# Patient Record
(demographics unavailable — no encounter records)

---

## 2024-11-12 NOTE — ASSESSMENT
[FreeTextEntry1] : 48-year-old male with left subclavian artery steal and multiple attacks of dizziness and ataxia  Subclavian steal:  Status post carotid subclavian bypass Bilateral carotid artery stenosis: Bilateral TCAR stents are patent Continue antiplatelet therapy aspirin 81 mg daily Continue medical management with atorvastatin and antidiabetic medication  Coronary artery disease: Status post CABG   PAD Ankle-brachial indicis makes follow-up

## 2024-11-12 NOTE — HISTORY OF PRESENT ILLNESS
[FreeTextEntry1] : 48-year-old male with coronary artery disease a status post CABG, bilateral carotid artery disease status post bilateral TCAR, left subclavian artery stenosis presented for evaluation of left subclavian steal.  He has multiple dizziness and ataxia and was found to have left proximal subclavian artery occlusion.  He also has left LIMA bypass during his coronary artery bypass grafting.  Recently he underwent left brachial approach attempt to revascularize left proximal subclavian however due to severe calcification and stenosis the lesion it could not be traversed.  He denies TIA amaurosis fugax numbness or weakness.  Complains of ataxia and dizziness usually with standing or exertional.  9/5/2024 patient is status post left carotid subclavian bypass.  Doing well.  Dizziness is somewhat decreased.    1/12/2024: Patient with left Subclavian steal status post carotid subclavian bypass.  Complains of orthostatic hypotension.  His dizziness is improved since the surgery however he still has orthostatic hypotension and dizziness associated with it.  He also is here after he saw his podiatrist.  He does not have any gangrene or wounds however complains of bilateral lower leg neuropathy.  Denies claudication.  Denies rest pain.

## 2024-11-12 NOTE — REVIEW OF SYSTEMS
[As Noted in HPI] : as noted in HPI [Confused] : no confusion [Convulsions] : no convulsions [Dizziness] : dizziness [Fainting] : no fainting [Limb Weakness] : no limb weakness [Difficulty Walking] : difficulty walking [Negative] : Heme/Lymph

## 2024-11-12 NOTE — PHYSICAL EXAM
[Ankle Swelling (On Exam)] : not present [Varicose Veins Of Lower Extremities] : not present [] : not present [Abdomen Tenderness] : ~T ~M No abdominal tenderness [de-identified] : Alert and oriented no acute distress [de-identified] : Head and neck within normal limit [de-identified] : TCAR incision completely healed [de-identified] : Clear to auscultation bilaterally [de-identified] : Regular rate and rhythm [FreeTextEntry1] : Palpable right radial pulse.  Palpable right brachial pulse.  Palpable bilateral femoral pulses.

## 2024-12-19 NOTE — PHYSICAL EXAM
[No Acute Distress] : no acute distress [Normal Oropharynx] : normal oropharynx [Normal Appearance] : normal appearance [No Neck Mass] : no neck mass [Normal Rate/Rhythm] : normal rate/rhythm [No Resp Distress] : no resp distress [Clear to Auscultation Bilaterally] : clear to auscultation bilaterally [No Abnormalities] : no abnormalities [Benign] : benign [Normal Gait] : normal gait [No Clubbing] : no clubbing [No Cyanosis] : no cyanosis [No Edema] : no edema [FROM] : FROM [Normal Color/ Pigmentation] : normal color/ pigmentation [No Focal Deficits] : no focal deficits [Oriented x3] : oriented x3 [Normal Affect] : normal affect

## 2024-12-19 NOTE — ASSESSMENT
[FreeTextEntry1] :  48y/o M with PMHX of CABG, orthostatic hypotension, DM, triple bypass and subclavian bypass, who presents to establish pulmonary care with a c/o sob.  Reports episodes of light headedness when standing, leading to sob. Denies cough, fever, with no known history of childhood asthma, eczema, frequent URI, history of PNA, or intubation.   -Ventilation/Perfusion imaging ordered -PFT ordered -Flu vaccination pending with sister's pharmacy  attending: agree with above extensive cardiovascular hx orthostatic hypotension. sob associated with those episodes nonsmoker and no pulm hx post cabg elevated left hemidiaphragm and fluoro with likely paresis though sx not c/w this. may have also recovered PFT. repeat diaphragmatic study

## 2024-12-19 NOTE — REVIEW OF SYSTEMS
[Dyspnea] : dyspnea [SOB on Exertion] : sob on exertion [Diabetes] : diabetes [Negative] : Psychiatric [Thyroid Problem] : no thyroid problem [Obesity] : no obesity

## 2024-12-19 NOTE — HISTORY OF PRESENT ILLNESS
[Never] : never [TextBox_4] :  Patient is a  50y/o M with PMHX of CABG, orthostatic hypotension, DM, triple bypass and subclavian bypass, who presents to establish pulmonary care with a c/o sob.  Reports episodes of light headedness when standing, leading to sob. Denies cough, fever, with no known history of childhood asthma, eczema, frequent URI, history of PNA, or intubation.

## 2024-12-19 NOTE — ASSESSMENT
[FreeTextEntry1] :  50y/o M with PMHX of CABG, orthostatic hypotension, DM, triple bypass and subclavian bypass, who presents to establish pulmonary care with a c/o sob.  Reports episodes of light headedness when standing, leading to sob. Denies cough, fever, with no known history of childhood asthma, eczema, frequent URI, history of PNA, or intubation.   -Ventilation/Perfusion imaging ordered -PFT ordered -Flu vaccination pending with sister's pharmacy  attending: agree with above extensive cardiovascular hx orthostatic hypotension. sob associated with those episodes nonsmoker and no pulm hx post cabg elevated left hemidiaphragm and fluoro with likely paresis though sx not c/w this. may have also recovered PFT. repeat diaphragmatic study

## 2025-04-15 NOTE — DATA REVIEWED
[FreeTextEntry1] : Carotid duplex revealed patent carotid subclavian bypass  ABIs were 0.75 bilaterally

## 2025-04-15 NOTE — HISTORY OF PRESENT ILLNESS
[FreeTextEntry1] : 49-year-old male with coronary artery disease a status post CABG, bilateral carotid artery disease status post bilateral TCAR, left subclavian artery stenosis presented for evaluation of left subclavian steal.  He has multiple dizziness and ataxia and was found to have left proximal subclavian artery occlusion.  He also has left LIMA bypass during his coronary artery bypass grafting.  Recently he underwent left brachial approach attempt to revascularize left proximal subclavian however due to severe calcification and stenosis the lesion it could not be traversed.  He denies TIA amaurosis fugax numbness or weakness.  Complains of ataxia and dizziness usually with standing or exertional.  9/5/2024 patient is status post left carotid subclavian bypass.  Doing well.  Dizziness is somewhat decreased.    1/12/2024: Patient with left Subclavian steal status post carotid subclavian bypass.  Complains of orthostatic hypotension.  His dizziness is improved since the surgery however he still has orthostatic hypotension and dizziness associated with it.  He also is here after he saw his podiatrist.  He does not have any gangrene or wounds however complains of bilateral lower leg neuropathy.  Denies claudication.  Denies rest pain.  4/15/25: Patient with history of bilateral carotid TCAR and left carotid to subclavian bypass for left carotid steal syndrome and severe dizziness.  No recent stroke.  No TIA fugax.  Complains of mild claudication

## 2025-04-15 NOTE — PHYSICAL EXAM
[Ankle Swelling (On Exam)] : not present [Varicose Veins Of Lower Extremities] : not present [] : not present [Abdomen Tenderness] : ~T ~M No abdominal tenderness [de-identified] : Alert and oriented no acute distress [de-identified] : Head and neck within normal limit [de-identified] : TCAR incision completely healed [de-identified] : Clear to auscultation bilaterally [de-identified] : Regular rate and rhythm [FreeTextEntry1] : Palpable right radial pulse.  Palpable right brachial pulse.  Palpable bilateral femoral pulses.

## 2025-04-15 NOTE — ASSESSMENT
[FreeTextEntry1] : 49-year-old male with left subclavian artery steal and multiple attacks of dizziness and ataxia  Subclavian steal:  Status post carotid subclavian bypass Bilateral carotid artery stenosis: Bilateral TCAR stents are patent Continue antiplatelet therapy aspirin 81 mg daily Continue medical management with atorvastatin and antidiabetic medication  Coronary artery disease: Status post CABG   PAD Ankle-brachial indicis 0.75 bilaterally Continue walking exercise